# Patient Record
Sex: FEMALE | Race: WHITE | Employment: OTHER | URBAN - NONMETROPOLITAN AREA
[De-identification: names, ages, dates, MRNs, and addresses within clinical notes are randomized per-mention and may not be internally consistent; named-entity substitution may affect disease eponyms.]

---

## 2017-09-22 ENCOUNTER — APPOINTMENT (OUTPATIENT)
Dept: CT IMAGING | Age: 82
End: 2017-09-22
Payer: MEDICARE

## 2017-09-22 ENCOUNTER — HOSPITAL ENCOUNTER (EMERGENCY)
Age: 82
Discharge: HOME | End: 2017-09-22
Attending: FAMILY MEDICINE
Payer: MEDICARE

## 2017-09-22 VITALS
TEMPERATURE: 98.1 F | HEART RATE: 78 BPM | OXYGEN SATURATION: 98 % | DIASTOLIC BLOOD PRESSURE: 83 MMHG | RESPIRATION RATE: 17 BRPM | SYSTOLIC BLOOD PRESSURE: 168 MMHG
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
## 2017-09-22 DIAGNOSIS — T07.XXXA MULTIPLE CONTUSIONS: ICD-10-CM

## 2017-09-22 LAB
ANION GAP SERPL CALCULATED.3IONS-SCNC: 10 MEQ/L (ref 8–16)
ANISOCYTOSIS: ABNORMAL
BASOPHILS # BLD: 0.8 %
BASOPHILS ABSOLUTE: 0 THOU/MM3 (ref 0–0.1)
BUN BLDV-MCNC: 15 MG/DL (ref 7–22)
CALCIUM SERPL-MCNC: 9.6 MG/DL (ref 8.5–10.5)
CHLORIDE BLD-SCNC: 103 MEQ/L (ref 98–111)
CO2: 29 MEQ/L (ref 23–33)
CREAT SERPL-MCNC: 0.9 MG/DL (ref 0.4–1.2)
EOSINOPHIL # BLD: 1.9 %
EOSINOPHILS ABSOLUTE: 0.1 THOU/MM3 (ref 0–0.4)
GFR SERPL CREATININE-BSD FRML MDRD: 59 ML/MIN/1.73M2
GLUCOSE BLD-MCNC: 105 MG/DL (ref 70–108)
HCT VFR BLD CALC: 37.7 % (ref 37–47)
HEMOGLOBIN: 12.6 GM/DL (ref 12–16)
LYMPHOCYTES # BLD: 19.8 %
LYMPHOCYTES ABSOLUTE: 1.1 THOU/MM3 (ref 1–4.8)
MCH RBC QN AUTO: 31.6 PG (ref 27–31)
MCHC RBC AUTO-ENTMCNC: 33.4 GM/DL (ref 33–37)
MCV RBC AUTO: 94.5 FL (ref 81–99)
MONOCYTES # BLD: 7.1 %
MONOCYTES ABSOLUTE: 0.4 THOU/MM3 (ref 0.4–1.3)
NUCLEATED RED BLOOD CELLS: 0 /100 WBC
OSMOLALITY CALCULATION: 284.3 MOSMOL/KG (ref 275–300)
PDW BLD-RTO: 14.7 % (ref 11.5–14.5)
PLATELET # BLD: 263 THOU/MM3 (ref 130–400)
PMV BLD AUTO: 8.2 MCM (ref 7.4–10.4)
POTASSIUM SERPL-SCNC: 4.5 MEQ/L (ref 3.5–5.2)
RBC # BLD: 3.98 MILL/MM3 (ref 4.2–5.4)
RBC # BLD: NORMAL 10*6/UL
SEG NEUTROPHILS: 70.4 %
SEGMENTED NEUTROPHILS ABSOLUTE COUNT: 4.1 THOU/MM3 (ref 1.8–7.7)
SODIUM BLD-SCNC: 142 MEQ/L (ref 135–145)
WBC # BLD: 5.8 THOU/MM3 (ref 4.8–10.8)

## 2017-09-22 PROCEDURE — 12002 RPR S/N/AX/GEN/TRNK2.6-7.5CM: CPT

## 2017-09-22 PROCEDURE — 80048 BASIC METABOLIC PNL TOTAL CA: CPT

## 2017-09-22 PROCEDURE — 72125 CT NECK SPINE W/O DYE: CPT

## 2017-09-22 PROCEDURE — 85025 COMPLETE CBC W/AUTO DIFF WBC: CPT

## 2017-09-22 PROCEDURE — 70450 CT HEAD/BRAIN W/O DYE: CPT

## 2017-09-22 PROCEDURE — 90471 IMMUNIZATION ADMIN: CPT | Performed by: FAMILY MEDICINE

## 2017-09-22 PROCEDURE — 99284 EMERGENCY DEPT VISIT MOD MDM: CPT

## 2017-09-22 PROCEDURE — 36415 COLL VENOUS BLD VENIPUNCTURE: CPT

## 2017-09-22 PROCEDURE — 6360000002 HC RX W HCPCS: Performed by: FAMILY MEDICINE

## 2017-09-22 PROCEDURE — 90714 TD VACC NO PRESV 7 YRS+ IM: CPT | Performed by: FAMILY MEDICINE

## 2017-09-22 RX ORDER — LIDOCAINE HYDROCHLORIDE AND EPINEPHRINE 10; 10 MG/ML; UG/ML
INJECTION, SOLUTION INFILTRATION; PERINEURAL
Status: DISCONTINUED
Start: 2017-09-22 | End: 2017-09-22 | Stop reason: HOSPADM

## 2017-09-22 RX ADMIN — CLOSTRIDIUM TETANI TOXOID ANTIGEN (FORMALDEHYDE INACTIVATED) AND CORYNEBACTERIUM DIPHTHERIAE TOXOID ANTIGEN (FORMALDEHYDE INACTIVATED) 0.5 ML: 5; 2 INJECTION, SUSPENSION INTRAMUSCULAR at 09:50

## 2017-09-22 ASSESSMENT — PAIN DESCRIPTION - PAIN TYPE: TYPE: ACUTE PAIN

## 2017-09-22 ASSESSMENT — PAIN DESCRIPTION - ORIENTATION: ORIENTATION: LEFT

## 2017-09-22 ASSESSMENT — PAIN SCALES - GENERAL: PAINLEVEL_OUTOF10: 3

## 2017-09-22 ASSESSMENT — ENCOUNTER SYMPTOMS
ROS SKIN COMMENTS: SCALP LACERATION
VOMITING: 0
WHEEZING: 0
SHORTNESS OF BREATH: 0
FACIAL SWELLING: 0
NAUSEA: 0

## 2017-09-22 ASSESSMENT — PAIN DESCRIPTION - LOCATION: LOCATION: HEAD

## 2018-03-05 DIAGNOSIS — R45.1 AGITATION: Primary | ICD-10-CM

## 2018-03-05 RX ORDER — LORAZEPAM 0.5 MG/1
0.5 TABLET ORAL NIGHTLY PRN
Qty: 30 TABLET | Refills: 2 | Status: SHIPPED | OUTPATIENT
Start: 2018-03-05 | End: 2018-03-05

## 2018-03-05 RX ORDER — LORAZEPAM 0.5 MG/1
0.5 TABLET ORAL NIGHTLY PRN
Qty: 30 TABLET | Refills: 2 | Status: SHIPPED | OUTPATIENT
Start: 2018-03-05 | End: 2018-04-04

## 2019-03-13 ENCOUNTER — OUTSIDE SERVICES (OUTPATIENT)
Dept: FAMILY MEDICINE CLINIC | Age: 84
End: 2019-03-13
Payer: MEDICARE

## 2019-03-13 DIAGNOSIS — F41.1 GAD (GENERALIZED ANXIETY DISORDER): ICD-10-CM

## 2019-03-13 DIAGNOSIS — E03.8 OTHER SPECIFIED HYPOTHYROIDISM: ICD-10-CM

## 2019-03-13 DIAGNOSIS — I10 ESSENTIAL HYPERTENSION: Primary | ICD-10-CM

## 2019-03-13 DIAGNOSIS — G30.1 LATE ONSET ALZHEIMER'S DISEASE WITHOUT BEHAVIORAL DISTURBANCE (HCC): ICD-10-CM

## 2019-03-13 DIAGNOSIS — E78.5 DYSLIPIDEMIA: ICD-10-CM

## 2019-03-13 DIAGNOSIS — N39.41 URGE INCONTINENCE OF URINE: ICD-10-CM

## 2019-03-13 DIAGNOSIS — G89.4 CHRONIC PAIN SYNDROME: ICD-10-CM

## 2019-03-13 DIAGNOSIS — F02.80 LATE ONSET ALZHEIMER'S DISEASE WITHOUT BEHAVIORAL DISTURBANCE (HCC): ICD-10-CM

## 2019-03-13 DIAGNOSIS — F51.01 PRIMARY INSOMNIA: ICD-10-CM

## 2019-03-13 PROCEDURE — 1123F ACP DISCUSS/DSCN MKR DOCD: CPT | Performed by: FAMILY MEDICINE

## 2019-03-13 PROCEDURE — G8484 FLU IMMUNIZE NO ADMIN: HCPCS | Performed by: FAMILY MEDICINE

## 2019-03-13 PROCEDURE — 99308 SBSQ NF CARE LOW MDM 20: CPT | Performed by: FAMILY MEDICINE

## 2019-03-13 PROCEDURE — 1101F PT FALLS ASSESS-DOCD LE1/YR: CPT | Performed by: FAMILY MEDICINE

## 2019-04-16 PROBLEM — F41.1 GAD (GENERALIZED ANXIETY DISORDER): Chronic | Status: ACTIVE | Noted: 2019-03-13

## 2019-04-16 PROBLEM — N39.41 URGE INCONTINENCE OF URINE: Chronic | Status: ACTIVE | Noted: 2019-03-13

## 2019-04-16 PROBLEM — G89.4 CHRONIC PAIN SYNDROME: Chronic | Status: ACTIVE | Noted: 2019-03-13

## 2019-04-16 PROBLEM — F02.80 LATE ONSET ALZHEIMER'S DISEASE WITHOUT BEHAVIORAL DISTURBANCE (HCC): Chronic | Status: ACTIVE | Noted: 2019-03-13

## 2019-04-16 PROBLEM — G30.1 LATE ONSET ALZHEIMER'S DISEASE WITHOUT BEHAVIORAL DISTURBANCE (HCC): Chronic | Status: ACTIVE | Noted: 2019-03-13

## 2019-04-16 NOTE — PROGRESS NOTES
Gilbert Bennett is a 80 y.o. female that is being seen at [facility] for [address]  2/5/1928      HPI:  Patient seen and examined at bedside. History obtained from patient and chart. Patient states that she is doing well, denies any acute issues. I have reviewed the patient's past medical history, past surgical history, allergies,medications, social and family history and I have made updates where appropriate. Past Medical History:   Diagnosis Date    Anemia     CVA (cerebral infarction)     MARCO ANTONIO (generalized anxiety disorder) 3/13/2019    Hypertension     Hypothyroidism 5/15/2015    Osteoarthritis        Past Surgical History:   Procedure Laterality Date    APPENDECTOMY  2009    Dr. Pato Simmons  4/1/09    Low anterior resection-Dr Knight    COLON SURGERY  2011    abdominal exploration, EPIFANIO, SBR, repair of ventral hernia-Dr. Knight    COLONOSCOPY  99, 04, 09, 10    Dr Adilson Rodriguez  2006    [facility]   [address]  2011    Dr. Cody Lyles       Social History     Tobacco Use    Smoking status: Never Smoker    Smokeless tobacco: Never Used   Substance Use Topics    Alcohol use: No    Drug use: No       Family History   Problem Relation Age of Onset    Arthritis Mother     Arthritis Father     Heart Attack Father          Review of Systems        PHYSICAL EXAM:    Weight - 112 lbs  BP - 122/73  Temp - 97.6  HR -   RR - 18    Physical Exam   Constitutional: She appears well-developed and well-nourished. No distress. HENT:   Head: Normocephalic and atraumatic. Right Ear: Hearing and external ear normal.   Left Ear: Hearing and external ear normal.   Nose: Nose normal.   Mouth/Throat: Oropharynx is clear and moist.   Eyes: Conjunctivae are normal. Right eye exhibits no discharge. Left eye exhibits no discharge. Neck: Normal range of motion. Neck supple. No tracheal deviation present. No thyromegaly present.    Cardiovascular: Normal

## 2019-04-19 ENCOUNTER — OUTSIDE SERVICES (OUTPATIENT)
Dept: FAMILY MEDICINE CLINIC | Age: 84
End: 2019-04-19
Payer: MEDICARE

## 2019-04-19 DIAGNOSIS — I10 ESSENTIAL HYPERTENSION: Chronic | ICD-10-CM

## 2019-04-19 DIAGNOSIS — F02.80 LATE ONSET ALZHEIMER'S DISEASE WITHOUT BEHAVIORAL DISTURBANCE (HCC): Chronic | ICD-10-CM

## 2019-04-19 DIAGNOSIS — G89.4 CHRONIC PAIN SYNDROME: Chronic | ICD-10-CM

## 2019-04-19 DIAGNOSIS — N39.41 URGE INCONTINENCE OF URINE: Chronic | ICD-10-CM

## 2019-04-19 DIAGNOSIS — G30.1 LATE ONSET ALZHEIMER'S DISEASE WITHOUT BEHAVIORAL DISTURBANCE (HCC): Chronic | ICD-10-CM

## 2019-04-19 DIAGNOSIS — F41.1 GAD (GENERALIZED ANXIETY DISORDER): Chronic | ICD-10-CM

## 2019-04-19 DIAGNOSIS — E03.8 OTHER SPECIFIED HYPOTHYROIDISM: Primary | Chronic | ICD-10-CM

## 2019-04-19 PROCEDURE — 99308 SBSQ NF CARE LOW MDM 20: CPT | Performed by: FAMILY MEDICINE

## 2019-04-19 PROCEDURE — 1123F ACP DISCUSS/DSCN MKR DOCD: CPT | Performed by: FAMILY MEDICINE

## 2019-05-03 ENCOUNTER — OUTSIDE SERVICES (OUTPATIENT)
Dept: FAMILY MEDICINE CLINIC | Age: 84
End: 2019-05-03
Payer: MEDICARE

## 2019-05-03 DIAGNOSIS — R50.9 FEVER, UNSPECIFIED FEVER CAUSE: Primary | ICD-10-CM

## 2019-05-03 PROCEDURE — 99308 SBSQ NF CARE LOW MDM 20: CPT | Performed by: NURSE PRACTITIONER

## 2019-05-03 NOTE — PATIENT INSTRUCTIONS
[address] Progress Note    NAME: Elisa Carlisle: 19  ROOM #: A 11-2  : 1928  REASON FOR VISIT: Fever    CODE STATUS: DNR/CC    History obtained from chart review, the patient and staff. SUBJECTIVE:  HPI: Sujatha Bills is a 80 y.o. female. Patient has a PMH significant for:  Past Medical History:   Diagnosis Date    Anemia     CVA (cerebral infarction)     MARCO ANTONIO (generalized anxiety disorder) 3/13/2019    Hypertension     Hypothyroidism 5/15/2015    Osteoarthritis        Pt seen and examined at bedside today for staff's concerns of low grade temperature and fatigue. She is noted to be in no acute distress. Staff provide appropriate updates; appreciate staff input. I have reviewed patients past medical, surgical, social, and family history and have made updates where appropriate. Allergies and Medications were reviewed through the [facility] EMR.     Patient Active Problem List    Diagnosis Date Noted    Late onset Alzheimer's disease without behavioral disturbance 2019    Primary insomnia 2019    Chronic pain syndrome 2019    Dyslipidemia 2019    MARCO ANTONIO (generalized anxiety disorder) 2019    Urge incontinence of urine 2019    Cerebral infarction ([facility] [address]      Replacing Inactive Diagnoses      SBO (small bowel obstruction) ([facility] [address] 05/15/2015    Hypothyroidism 05/15/2015    HTN (hypertension) 2012    Osteoarthritis 2012       REVIEW OF SYSTEMS  Positive responses are highlighted in bold  Constitutional:  Fever, Chills, Night Sweats, Fatigue, Unexpected changes in weight  HENT:  Ear pain, Tinnitus, Nosebleeds, Trouble swallowing, Hearing loss, Sore throat  Cardiovascular:  Chest Pain, Palpitations, Orthopnea, Paroxysmal Nocturnal Dyspnea  Respiratory:  Cough, Wheezing, Shortness of breath, Chest tightness, Apnea  Gastrointestinal:  Nausea, Vomiting, Diarrhea, Constipation, Heartburn, Blood in stool  Genitourinary:  Difficulty or painful urination, Flank pain, Change in frequency, Urgency  Skin:  Color change, Rash, Itching, Wound  Musculoskeletal:  Joint pain, Back pain, Gait problems, Joint swelling, Myalgias  Neurological:  Dizziness, Headaches, Presyncope, Numbness, Seizures, Tremors  Endocrine:  Heat Intolerance, Cold Intolerance, Polydipsia, Polyphagia, Polyuria    OBJECTIVE:  PHYSICAL EXAM:  VS:  121/78, 98.7, 91, 19, 95%  Weight in pounds:  112.2  Pain: Denies    VS reviewed. General Appearance: well developed and well- nourished, in no acute distress  Head: normocephalic and atraumatic  Eyes: conjunctivae and eye lids without erythema  ENT: external ear and ear canal normal bilaterally, nose without deformity, nasal mucosa and turbinates normal without polyps, oropharynx normal, dentition is normal for age, no lip or gum lesions noted  Neck: supple and non-tender without mass, no thyromegaly or thyroid nodules, no cervical lymphadenopathy  Pulmonary/Chest: clear but diminished to auscultation bilaterally- no wheezes, rales or rhonchi, normal air movement, no respiratory distress or retractions, requires no supplemental oxygen  Cardiovascular: normal rate, regular rhythm, normal S1 and S2, no murmurs, rubs, clicks, or gallops, distal pulses intact  Abdomen: soft, non-tender, non-distended, bowel sounds physiologic,  no rebound or guarding, no masses or hernias noted. Liver and spleen without enlargement. Extremities: no cyanosis, clubbing or edema of the lower extremities  Musculoskeletal: No joint swelling or gross deformity, she is deconditioned  Neuro:  Alert, 4/5 strength globally and symmetrically, normal speech, no focal findings or movement disorder noted  Psych:  Normal affect without evidence of depression or anxiety, insight and judgement are intact, memory appears intat  Skin: warm and dry, no rash or erythema    ASSESSMENT & PLAN:    1.  Fever, unspecified fever cause  Staff report low grade temperature this morning of 99.1.  Patient is in bed and c/o feeling as if she has a cold. She is fatigued. Asking for Tylenol for comfort. Asking for stat CBC, BMP, CXR and to send urine for C&S. Asking for frequent VS with call parameters. She is well cared for by staff. Disposition:  Assessment and plan as stated above. Follow-up per routine and as warranted. Plan of care reviewed by Dr. Pat Chowdhury DO.   Electronically signed by SHAE Arias NP on 5/3/2019 at 6:56 PM

## 2019-05-29 ENCOUNTER — OUTSIDE SERVICES (OUTPATIENT)
Dept: FAMILY MEDICINE CLINIC | Age: 84
End: 2019-05-29
Payer: MEDICARE

## 2019-05-29 DIAGNOSIS — G30.1 LATE ONSET ALZHEIMER'S DISEASE WITHOUT BEHAVIORAL DISTURBANCE (HCC): Chronic | ICD-10-CM

## 2019-05-29 DIAGNOSIS — F51.01 PRIMARY INSOMNIA: ICD-10-CM

## 2019-05-29 DIAGNOSIS — E78.5 DYSLIPIDEMIA: ICD-10-CM

## 2019-05-29 DIAGNOSIS — F41.1 GAD (GENERALIZED ANXIETY DISORDER): Chronic | ICD-10-CM

## 2019-05-29 DIAGNOSIS — F02.80 LATE ONSET ALZHEIMER'S DISEASE WITHOUT BEHAVIORAL DISTURBANCE (HCC): Chronic | ICD-10-CM

## 2019-05-29 DIAGNOSIS — F33.0 MILD EPISODE OF RECURRENT MAJOR DEPRESSIVE DISORDER (HCC): ICD-10-CM

## 2019-05-29 DIAGNOSIS — E03.8 OTHER SPECIFIED HYPOTHYROIDISM: Chronic | ICD-10-CM

## 2019-05-29 DIAGNOSIS — I10 ESSENTIAL HYPERTENSION: Chronic | ICD-10-CM

## 2019-05-29 DIAGNOSIS — K56.609 SBO (SMALL BOWEL OBSTRUCTION) (HCC): Primary | ICD-10-CM

## 2019-05-29 PROBLEM — F33.9 RECURRENT MAJOR DEPRESSIVE DISORDER (HCC): Status: ACTIVE | Noted: 2019-05-29

## 2019-05-29 PROCEDURE — 99309 SBSQ NF CARE MODERATE MDM 30: CPT | Performed by: NURSE PRACTITIONER

## 2019-05-29 NOTE — PROGRESS NOTES
Heartburn, Blood in stool  Genitourinary:  Difficulty or painful urination, Flank pain, Change in frequency, Urgency  Skin:  Color change, Rash, Itching, Wound  Musculoskeletal:  Joint pain, Back pain, Gait problems, Joint swelling, Myalgias  Neurological:  Dizziness, Headaches, Presyncope, Numbness, Seizures, Tremors  Endocrine:  Heat Intolerance, Cold Intolerance, Polydipsia, Polyphagia, Polyuria    OBJECTIVE:  PHYSICAL EXAM:  VS:  128/73, 97.6, 67, 16, 93%  Weight in pounds:  101.2  Pain: Denies    VS reviewed. General Appearance: debilitated, in no acute distress  Head: normocephalic and atraumatic  Eyes: conjunctivae and eye lids without erythema  ENT: external ear and ear canal normal bilaterally, nose without deformity, nasal mucosa and turbinates normal without polyps, oropharynx normal, dentition is normal for age, no lip or gum lesions noted  Neck: supple and non-tender without mass, no thyromegaly or thyroid nodules, no cervical lymphadenopathy  Pulmonary/Chest: clear but diminished to auscultation bilaterally- no wheezes, rales or rhonchi, normal air movement, no respiratory distress or retractions, requires no supplemental oxygen  Cardiovascular: normal rate, regular rhythm, normal S1 and S2, no murmurs, rubs, clicks, or gallops, distal pulses intact  Abdomen: soft, non-tender, non-distended, bowel sounds physiologic,  no rebound or guarding, no masses or hernias noted. Liver and spleen without enlargement. Chronic, intermittent diarrhea and constipation. Extremities: no cyanosis, clubbing or edema of the lower extremities  Musculoskeletal: No joint swelling or gross deformity   Neuro:  Alert, 4/5 strength globally and symmetrically, normal speech, no focal findings or movement disorder noted  Psych:  Normal affect without evidence of depression or anxiety, insight and judgement are mostly intact, memory appears mostly intact  Skin: warm and dry, no rash or erythema    ASSESSMENT & PLAN:    1.  SBO (small bowel obstruction) (HCC)  History of bowel obstruction with bowel surgery. Daughter is involved in her care, and requests no anti-diarrheals due to her history of constipation and obstruction. Today, patient c/o no BM in 2 days. BS active throughout. She is non-distended. A staff member who speaks Korean assists to translate - appreciate staff assistance. Patient doesn't like to take Miralax because she then \"has diarrhea\". Patient agrees to take 1/2 dose of Miralax (8.5 grams) po this morning - encourage staff to offer her a 1/2 dose of Miralax in the mornings. Staff nurse updated. 2. Late onset Alzheimer's disease without behavioral disturbance  Patient is mostly pleasant and compliant. No behaviors. 3. Essential hypertension  BP at goal with Lopressor. She denies CP. 4. Primary insomnia  Controlled with Trazodone. 5. Other specified hypothyroidism  Within range with current dose of Levothyroxine. Continue TSH FT4 monitoring. 6. Dyslipidemia  Continue Pravastatin. No myalgias. 7. MARCO ANTONIO (generalized anxiety disorder)  Continue prn Ativan. Staff report anxiety is controlled. 8. Mild episode of recurrent major depressive disorder ([facility] [address]  Continue sessions with [facility] - patient is benefiting from Therapy. Daughter has health debilities but visits often. Patient lived at home with her daughter prior to admission to [facility]. Continue current dose of Prozac. Disposition:  Assessment and plan as stated above. Follow-up per routine and as warranted. Plan of care reviewed by Dr. Ana Diop DO.   Electronically signed by SHAE Newell NP on 5/29/2019 at 12:31 PM

## 2019-06-18 ENCOUNTER — OUTSIDE SERVICES (OUTPATIENT)
Dept: FAMILY MEDICINE CLINIC | Age: 84
End: 2019-06-18
Payer: MEDICARE

## 2019-06-18 DIAGNOSIS — F51.01 PRIMARY INSOMNIA: ICD-10-CM

## 2019-06-18 DIAGNOSIS — E03.8 OTHER SPECIFIED HYPOTHYROIDISM: Chronic | ICD-10-CM

## 2019-06-18 DIAGNOSIS — N39.41 URGE INCONTINENCE OF URINE: Chronic | ICD-10-CM

## 2019-06-18 DIAGNOSIS — M19.90 OSTEOARTHRITIS, UNSPECIFIED OSTEOARTHRITIS TYPE, UNSPECIFIED SITE: ICD-10-CM

## 2019-06-18 DIAGNOSIS — E78.5 DYSLIPIDEMIA: ICD-10-CM

## 2019-06-18 DIAGNOSIS — G30.1 LATE ONSET ALZHEIMER'S DISEASE WITHOUT BEHAVIORAL DISTURBANCE (HCC): Chronic | ICD-10-CM

## 2019-06-18 DIAGNOSIS — F33.0 MILD EPISODE OF RECURRENT MAJOR DEPRESSIVE DISORDER (HCC): ICD-10-CM

## 2019-06-18 DIAGNOSIS — F02.80 LATE ONSET ALZHEIMER'S DISEASE WITHOUT BEHAVIORAL DISTURBANCE (HCC): Chronic | ICD-10-CM

## 2019-06-18 DIAGNOSIS — K56.609 SBO (SMALL BOWEL OBSTRUCTION) (HCC): Primary | ICD-10-CM

## 2019-06-18 DIAGNOSIS — F41.1 GAD (GENERALIZED ANXIETY DISORDER): Chronic | ICD-10-CM

## 2019-06-18 DIAGNOSIS — I63.9 CEREBRAL INFARCTION, UNSPECIFIED MECHANISM (HCC): ICD-10-CM

## 2019-06-18 DIAGNOSIS — I10 ESSENTIAL HYPERTENSION: Chronic | ICD-10-CM

## 2019-06-18 DIAGNOSIS — G89.4 CHRONIC PAIN SYNDROME: Chronic | ICD-10-CM

## 2019-06-18 PROCEDURE — 99309 SBSQ NF CARE MODERATE MDM 30: CPT | Performed by: NURSE PRACTITIONER

## 2019-06-19 NOTE — PROGRESS NOTES
[facility] Progress Note    NAME: Desmond Fulton: 19  ROOM #: A 11-2  : 1928  REASON FOR VISIT: Other (Routine visit.)    CODE STATUS: DNR/CC    History obtained from chart review, the patient and staff. SUBJECTIVE:  HPI: Andra Dakins is a 80 y.o. female. Patient has a PMH significant for:  Past Medical History:   Diagnosis Date    Anemia     CVA (cerebral infarction)     MARCO ANTONIO (generalized anxiety disorder) 3/13/2019    Hypertension     Hypothyroidism 5/15/2015    Osteoarthritis     Recurrent major depressive disorder ([facility] [address] 2019       Pt seen and examined at bedside today and is noted to be in no acute distress. Staff provide appropriate updates; appreciate staff input. I have reviewed patients past medical, surgical, social, and family history and have made updates where appropriate. Allergies and Medications were reviewed through the [facility] EMR.     Patient Active Problem List    Diagnosis Date Noted    Recurrent major depressive disorder ([facility] [address] 2019    Late onset Alzheimer's disease without behavioral disturbance 2019    Primary insomnia 2019    Chronic pain syndrome 2019    Dyslipidemia 2019    MARCO ANTONIO (generalized anxiety disorder) 2019    Urge incontinence of urine 2019    Cerebral infarction ([address]      Replacing Inactive Diagnoses      SBO (small bowel obstruction) ([facility] [address] 05/15/2015    Hypothyroidism 05/15/2015    HTN (hypertension) 2012    Osteoarthritis 2012       REVIEW OF SYSTEMS  Positive responses are highlighted in bold  Constitutional:  Fever, Chills, Night Sweats, Fatigue, Unexpected changes in weight  HENT:  Ear pain, Tinnitus, Nosebleeds, Trouble swallowing, Hearing loss, Sore throat, Headaches  Cardiovascular:  Chest Pain, Palpitations, Orthopnea, Paroxysmal Nocturnal Dyspnea  Respiratory:  Cough, Wheezing, Shortness of breath, Chest tightness, Apnea  Gastrointestinal:  Nausea, Vomiting, Diarrhea, occur. Appreciate staff input and close monitoring. She is well cared for by staff. Disposition:  Assessment and plan as stated above. Follow-up per routine and as warranted. Plan of care reviewed by Dr. Chin Villegas DO.   Electronically signed by SHAE Mendez NP on 6/19/2019 at 8:38 AM

## 2019-07-17 NOTE — PROGRESS NOTES
Gricelda Goodman is a 80 y.o. female that is being seen at [facility] for [address]  2/5/1928      HPI:  Patient seen and examined at bedside. History obtained from patient and chart. Patient states that she is doing well, denies any acute issues. States that she is eating well. No constipation. Pain controlled. I have reviewed the patient's past medical history, past surgical history, allergies,medications, social and family history and I have made updates where appropriate. Past Medical History:   Diagnosis Date    Anemia     CVA (cerebral infarction)     MARCO ANTONIO (generalized anxiety disorder) 3/13/2019    Hypertension     Hypothyroidism 5/15/2015    Osteoarthritis     Recurrent major depressive disorder ([address] 5/29/2019       Past Surgical History:   Procedure Laterality Date    APPENDECTOMY  2009    Dr. Carole Crowder  4/1/09    Low anterior resection-Dr Knight    COLON SURGERY  2011    abdominal exploration, EPIFANIO, SBR, repair of ventral hernia-Dr. Knight    COLONOSCOPY  99, 04, 09, 10    Dr Nicky Saini  [facility]   Gaby Wolfe  2011    Dr. George Comfort History     Tobacco Use    Smoking status: Never Smoker    Smokeless tobacco: Never Used   Substance Use Topics    Alcohol use: No    Drug use: No       Family History   Problem Relation Age of Onset    Arthritis Mother     Arthritis Father     Heart Attack Father          Review of Systems        PHYSICAL EXAM:    Weight - 107 lbs  BP - 133/75  Temp - 98.0  HR - 62  RR - 17    Physical Exam   Constitutional: She appears well-developed and well-nourished. No distress. HENT:   Head: Normocephalic and atraumatic. Right Ear: Hearing and external ear normal.   Left Ear: Hearing and external ear normal.   Nose: Nose normal.   Mouth/Throat: Oropharynx is clear and moist.   Eyes: Conjunctivae are normal. Right eye exhibits no discharge.  Left eye exhibits no

## 2019-07-19 ENCOUNTER — OUTSIDE SERVICES (OUTPATIENT)
Dept: FAMILY MEDICINE CLINIC | Age: 84
End: 2019-07-19
Payer: MEDICARE

## 2019-07-19 DIAGNOSIS — F41.1 GAD (GENERALIZED ANXIETY DISORDER): Chronic | ICD-10-CM

## 2019-07-19 DIAGNOSIS — I10 ESSENTIAL HYPERTENSION: Primary | Chronic | ICD-10-CM

## 2019-07-19 DIAGNOSIS — E03.8 OTHER SPECIFIED HYPOTHYROIDISM: Chronic | ICD-10-CM

## 2019-07-19 DIAGNOSIS — N39.41 URGE INCONTINENCE OF URINE: Chronic | ICD-10-CM

## 2019-07-19 DIAGNOSIS — G30.1 LATE ONSET ALZHEIMER'S DISEASE WITHOUT BEHAVIORAL DISTURBANCE (HCC): Chronic | ICD-10-CM

## 2019-07-19 DIAGNOSIS — F02.80 LATE ONSET ALZHEIMER'S DISEASE WITHOUT BEHAVIORAL DISTURBANCE (HCC): Chronic | ICD-10-CM

## 2019-07-19 DIAGNOSIS — G89.4 CHRONIC PAIN SYNDROME: Chronic | ICD-10-CM

## 2019-07-19 PROCEDURE — 99308 SBSQ NF CARE LOW MDM 20: CPT | Performed by: FAMILY MEDICINE

## 2019-08-20 ENCOUNTER — OUTSIDE SERVICES (OUTPATIENT)
Dept: FAMILY MEDICINE CLINIC | Age: 84
End: 2019-08-20
Payer: MEDICARE

## 2019-08-20 DIAGNOSIS — F41.1 GAD (GENERALIZED ANXIETY DISORDER): Chronic | ICD-10-CM

## 2019-08-20 DIAGNOSIS — E78.5 DYSLIPIDEMIA: ICD-10-CM

## 2019-08-20 DIAGNOSIS — F33.0 MILD EPISODE OF RECURRENT MAJOR DEPRESSIVE DISORDER (HCC): Primary | ICD-10-CM

## 2019-08-20 DIAGNOSIS — M19.90 OSTEOARTHRITIS, UNSPECIFIED OSTEOARTHRITIS TYPE, UNSPECIFIED SITE: ICD-10-CM

## 2019-08-20 DIAGNOSIS — F02.80 LATE ONSET ALZHEIMER'S DISEASE WITHOUT BEHAVIORAL DISTURBANCE (HCC): Chronic | ICD-10-CM

## 2019-08-20 DIAGNOSIS — G30.1 LATE ONSET ALZHEIMER'S DISEASE WITHOUT BEHAVIORAL DISTURBANCE (HCC): Chronic | ICD-10-CM

## 2019-08-20 DIAGNOSIS — E03.8 OTHER SPECIFIED HYPOTHYROIDISM: Chronic | ICD-10-CM

## 2019-08-20 DIAGNOSIS — I10 ESSENTIAL HYPERTENSION: Chronic | ICD-10-CM

## 2019-08-20 DIAGNOSIS — G89.4 CHRONIC PAIN SYNDROME: Chronic | ICD-10-CM

## 2019-08-20 DIAGNOSIS — I63.9 CEREBRAL INFARCTION, UNSPECIFIED MECHANISM (HCC): ICD-10-CM

## 2019-08-20 PROCEDURE — 99308 SBSQ NF CARE LOW MDM 20: CPT | Performed by: NURSE PRACTITIONER

## 2019-09-25 ENCOUNTER — OUTSIDE SERVICES (OUTPATIENT)
Dept: FAMILY MEDICINE CLINIC | Age: 84
End: 2019-09-25
Payer: MEDICARE

## 2019-09-25 DIAGNOSIS — F02.80 LATE ONSET ALZHEIMER'S DISEASE WITHOUT BEHAVIORAL DISTURBANCE (HCC): Primary | Chronic | ICD-10-CM

## 2019-09-25 DIAGNOSIS — I10 ESSENTIAL HYPERTENSION: Chronic | ICD-10-CM

## 2019-09-25 DIAGNOSIS — F33.0 MILD EPISODE OF RECURRENT MAJOR DEPRESSIVE DISORDER (HCC): ICD-10-CM

## 2019-09-25 DIAGNOSIS — K56.609 SBO (SMALL BOWEL OBSTRUCTION) (HCC): ICD-10-CM

## 2019-09-25 DIAGNOSIS — G30.1 LATE ONSET ALZHEIMER'S DISEASE WITHOUT BEHAVIORAL DISTURBANCE (HCC): Primary | Chronic | ICD-10-CM

## 2019-09-25 DIAGNOSIS — E78.5 DYSLIPIDEMIA: ICD-10-CM

## 2019-09-25 DIAGNOSIS — G89.4 CHRONIC PAIN SYNDROME: Chronic | ICD-10-CM

## 2019-09-25 DIAGNOSIS — F51.01 PRIMARY INSOMNIA: ICD-10-CM

## 2019-09-25 DIAGNOSIS — F41.1 GAD (GENERALIZED ANXIETY DISORDER): Chronic | ICD-10-CM

## 2019-09-25 DIAGNOSIS — I63.9 CEREBRAL INFARCTION, UNSPECIFIED MECHANISM (HCC): ICD-10-CM

## 2019-09-25 DIAGNOSIS — E03.8 OTHER SPECIFIED HYPOTHYROIDISM: Chronic | ICD-10-CM

## 2019-09-25 PROCEDURE — 99308 SBSQ NF CARE LOW MDM 20: CPT | Performed by: NURSE PRACTITIONER

## 2019-10-17 ENCOUNTER — OUTSIDE SERVICES (OUTPATIENT)
Dept: FAMILY MEDICINE CLINIC | Age: 84
End: 2019-10-17
Payer: MEDICARE

## 2019-10-17 DIAGNOSIS — I63.9 CEREBRAL INFARCTION, UNSPECIFIED MECHANISM (HCC): ICD-10-CM

## 2019-10-17 DIAGNOSIS — E03.8 OTHER SPECIFIED HYPOTHYROIDISM: Chronic | ICD-10-CM

## 2019-10-17 DIAGNOSIS — F02.80 LATE ONSET ALZHEIMER'S DISEASE WITHOUT BEHAVIORAL DISTURBANCE (HCC): Chronic | ICD-10-CM

## 2019-10-17 DIAGNOSIS — F41.1 GAD (GENERALIZED ANXIETY DISORDER): Chronic | ICD-10-CM

## 2019-10-17 DIAGNOSIS — E78.5 DYSLIPIDEMIA: ICD-10-CM

## 2019-10-17 DIAGNOSIS — G89.4 CHRONIC PAIN SYNDROME: Chronic | ICD-10-CM

## 2019-10-17 DIAGNOSIS — I10 ESSENTIAL HYPERTENSION: Primary | Chronic | ICD-10-CM

## 2019-10-17 DIAGNOSIS — K56.609 SBO (SMALL BOWEL OBSTRUCTION) (HCC): ICD-10-CM

## 2019-10-17 DIAGNOSIS — G30.1 LATE ONSET ALZHEIMER'S DISEASE WITHOUT BEHAVIORAL DISTURBANCE (HCC): Chronic | ICD-10-CM

## 2019-10-17 DIAGNOSIS — F33.0 MILD EPISODE OF RECURRENT MAJOR DEPRESSIVE DISORDER (HCC): ICD-10-CM

## 2019-10-17 DIAGNOSIS — F51.01 PRIMARY INSOMNIA: ICD-10-CM

## 2019-10-17 PROCEDURE — 99309 SBSQ NF CARE MODERATE MDM 30: CPT | Performed by: NURSE PRACTITIONER

## 2019-11-13 VITALS
RESPIRATION RATE: 18 BRPM | TEMPERATURE: 97.4 F | BODY MASS INDEX: 19.39 KG/M2 | HEART RATE: 72 BPM | WEIGHT: 106 LBS | SYSTOLIC BLOOD PRESSURE: 131 MMHG | DIASTOLIC BLOOD PRESSURE: 76 MMHG

## 2019-11-15 ENCOUNTER — OUTSIDE SERVICES (OUTPATIENT)
Dept: FAMILY MEDICINE CLINIC | Age: 84
End: 2019-11-15
Payer: MEDICARE

## 2019-11-15 DIAGNOSIS — G30.1 LATE ONSET ALZHEIMER'S DISEASE WITHOUT BEHAVIORAL DISTURBANCE (HCC): Chronic | ICD-10-CM

## 2019-11-15 DIAGNOSIS — E03.8 OTHER SPECIFIED HYPOTHYROIDISM: Chronic | ICD-10-CM

## 2019-11-15 DIAGNOSIS — F41.1 GAD (GENERALIZED ANXIETY DISORDER): Chronic | ICD-10-CM

## 2019-11-15 DIAGNOSIS — I10 ESSENTIAL HYPERTENSION: Chronic | ICD-10-CM

## 2019-11-15 DIAGNOSIS — F02.80 LATE ONSET ALZHEIMER'S DISEASE WITHOUT BEHAVIORAL DISTURBANCE (HCC): Chronic | ICD-10-CM

## 2019-11-15 DIAGNOSIS — G89.4 CHRONIC PAIN SYNDROME: Chronic | ICD-10-CM

## 2019-11-15 DIAGNOSIS — N39.41 URGE INCONTINENCE OF URINE: Primary | Chronic | ICD-10-CM

## 2019-11-15 PROCEDURE — 99308 SBSQ NF CARE LOW MDM 20: CPT | Performed by: FAMILY MEDICINE

## 2019-12-10 ENCOUNTER — OUTSIDE SERVICES (OUTPATIENT)
Dept: FAMILY MEDICINE CLINIC | Age: 84
End: 2019-12-10
Payer: MEDICARE

## 2019-12-10 DIAGNOSIS — I63.9 CEREBRAL INFARCTION, UNSPECIFIED MECHANISM (HCC): ICD-10-CM

## 2019-12-10 DIAGNOSIS — I10 ESSENTIAL HYPERTENSION: Chronic | ICD-10-CM

## 2019-12-10 DIAGNOSIS — M19.90 OSTEOARTHRITIS, UNSPECIFIED OSTEOARTHRITIS TYPE, UNSPECIFIED SITE: Primary | ICD-10-CM

## 2019-12-10 DIAGNOSIS — F41.1 GAD (GENERALIZED ANXIETY DISORDER): Chronic | ICD-10-CM

## 2019-12-10 DIAGNOSIS — E03.8 OTHER SPECIFIED HYPOTHYROIDISM: Chronic | ICD-10-CM

## 2019-12-10 DIAGNOSIS — K56.609 SBO (SMALL BOWEL OBSTRUCTION) (HCC): ICD-10-CM

## 2019-12-10 DIAGNOSIS — F33.0 MILD EPISODE OF RECURRENT MAJOR DEPRESSIVE DISORDER (HCC): ICD-10-CM

## 2019-12-10 DIAGNOSIS — E78.5 DYSLIPIDEMIA: ICD-10-CM

## 2019-12-10 DIAGNOSIS — F02.80 LATE ONSET ALZHEIMER'S DISEASE WITHOUT BEHAVIORAL DISTURBANCE (HCC): Chronic | ICD-10-CM

## 2019-12-10 DIAGNOSIS — G30.1 LATE ONSET ALZHEIMER'S DISEASE WITHOUT BEHAVIORAL DISTURBANCE (HCC): Chronic | ICD-10-CM

## 2019-12-10 PROCEDURE — 99308 SBSQ NF CARE LOW MDM 20: CPT | Performed by: NURSE PRACTITIONER

## 2020-01-15 ENCOUNTER — OUTSIDE SERVICES (OUTPATIENT)
Dept: FAMILY MEDICINE CLINIC | Age: 85
End: 2020-01-15
Payer: MEDICARE

## 2020-01-15 PROCEDURE — 99309 SBSQ NF CARE MODERATE MDM 30: CPT | Performed by: NURSE PRACTITIONER

## 2020-01-16 NOTE — PROGRESS NOTES
[address] [facility]  Progress Note    NAME: Bonnie Lynn: [birth year]  ROOM #: A 11-2  : 1928  REASON FOR VISIT: Other (routine visit)    CODE STATUS: DNR/CC    History obtained from chart review, the patient and staff. SUBJECTIVE:  HPI: Luz Salazar is a 80 y.o. female. Patient has a PMH significant for:  Past Medical History:   Diagnosis Date    Anemia     CVA (cerebral infarction)     MARCO ANTONIO (generalized anxiety disorder) 3/13/2019    Hypertension     Hypothyroidism 5/15/2015    Osteoarthritis     Recurrent major depressive disorder ([facility] [address] 2019       Pt seen and examined at bedside today and is noted to be in no acute distress. Staff provide appropriate updates; appreciate staff input. I have reviewed patients past medical, surgical, social, and family history and have made updates where appropriate. Allergies and Medications were reviewed through the [facility] EMR.     Patient Active Problem List    Diagnosis Date Noted    Recurrent major depressive disorder ([facility] [address] 2019    Late onset Alzheimer's disease without behavioral disturbance ([address] 2019    Primary insomnia 2019    Chronic pain syndrome 2019    Dyslipidemia 2019    MARCO ANTONIO (generalized anxiety disorder) 2019    Urge incontinence of urine 2019    Cerebral infarction ([address]      Replacing Inactive Diagnoses      SBO (small bowel obstruction) ([facility] [address] 05/15/2015    Hypothyroidism 05/15/2015    HTN (hypertension) 2012    Osteoarthritis 2012       REVIEW OF SYSTEMS  Positive responses are highlighted in bold  Constitutional:  Fever, Chills, Night Sweats, Fatigue, Unexpected changes in weight  HENT:  Ear pain, Tinnitus, Nosebleeds, Trouble swallowing, Hearing loss, Sore throat, Headaches  Cardiovascular:  Chest Pain, Palpitations, Orthopnea, Paroxysmal Nocturnal Dyspnea  Respiratory:  Cough, Wheezing, Shortness of breath, Chest tightness, Apnea  Gastrointestinal:  Nausea, Vomiting, Diarrhea, Constipation, Heartburn, Blood in stool, \"bowels are moving good\"  Genitourinary:  Difficulty or painful urination, Flank pain, Change in frequency, Urgency  Skin:  Color change, Rash, Itching, Wound  Musculoskeletal:  Joint pain, Back pain, Gait problems, Joint swelling, Myalgias  Neurological:  Dizziness, Headaches, Presyncope, Numbness, Seizures, Tremors; acute posterior neck pain  Endocrine:  Heat Intolerance, Cold Intolerance, Polydipsia, Polyphagia, Polyuria    OBJECTIVE:  PHYSICAL EXAM:  VS:  122/74, 97.8, 67, 16  Weight in pounds:  104.8 (was 104.0, 106.2, 107.4, 108, 106.4)  Pain:  Denies. VS reviewed. General Appearance: well developed and well- nourished, debilitated, in no acute distress  Head: normocephalic and atraumatic  Eyes: conjunctivae and eye lids without erythema  ENT: external ear and ear canal normal bilaterally, nose without deformity, nasal mucosa and turbinates normal without polyps, oropharynx normal, dentition is normal for age, no lip or gum lesions noted  Neck: supple and non-tender without mass, no thyromegaly or thyroid nodules, no cervical lymphadenopathy  Pulmonary/Chest: clear but diminished to auscultation bilaterally- no wheezes, rales or rhonchi, normal air movement, no respiratory distress or retractions, requires no supplemental oxygen  Cardiovascular: normal rate, regular rhythm, normal S1 and S2, no murmurs, rubs, clicks, or gallops, distal pulses intact  Abdomen: soft, non-tender, non-distended, bowel sounds physiologic,  no rebound or guarding, no masses or hernias noted. Liver and spleen without enlargement.    Extremities: no cyanosis, clubbing or edema of the lower extremities  Musculoskeletal: No joint swelling or gross deformity   Neuro:  Alert, 4/5 strength globally and symmetrically, normal speech, no focal findings or movement disorder noted  Psych:  Normal affect without evidence of depression or anxiety, insight and judgement are mostly intact, Dyslipidemia  Continue Pravastatin. No ASA. 11. Osteoarthritis, unspecified osteoarthritis type, unspecified site  Continue calcium plus D. 12. Cerebral infarction, unspecified mechanism ([facility] [address]  Remote without deficits. 13.  Acute headaches. No new or reoccurring headaches. Continue prn Tylenol as needed. Disposition:  Assessment and plan as stated above. Follow-up per routine and as warranted. Plan of care reviewed by Dr. Daniel Gandhi DO.   Electronically signed by SHAE Rosario NP on 1/16/2020 at 9:57 AM

## 2020-02-10 ENCOUNTER — OUTSIDE SERVICES (OUTPATIENT)
Dept: FAMILY MEDICINE CLINIC | Age: 85
End: 2020-02-10
Payer: MEDICARE

## 2020-02-10 PROCEDURE — 99309 SBSQ NF CARE MODERATE MDM 30: CPT | Performed by: NURSE PRACTITIONER

## 2020-02-10 NOTE — PROGRESS NOTES
[facility] [facility]  Progress Note    NAME: Grayson Perdomot: 2/10/20  ROOM #: A 11-2  : 1928  REASON FOR VISIT: Other (routine visit)    CODE STATUS: DNR/CC    History obtained from chart review, the patient and staff. SUBJECTIVE:  HPI: Leninюлия Olivo is a 80 y.o. female. Patient has a PMH significant for:  Past Medical History:   Diagnosis Date    Anemia     CVA (cerebral infarction)     MARCO ANTONIO (generalized anxiety disorder) 3/13/2019    Hypertension     Hypothyroidism 5/15/2015    Osteoarthritis     Recurrent major depressive disorder ([facility] 2019       Pt seen and examined at bedside today and is noted to be in no acute distress. Staff provide appropriate updates; appreciate staff input. I have reviewed patients past medical, surgical, social, and family history and have made updates where appropriate. Allergies and Medications were reviewed through the [facility] EMR.     Patient Active Problem List    Diagnosis Date Noted    Recurrent major depressive disorder ([facility] [address] 2019    Late onset Alzheimer's disease without behavioral disturbance ([facility] [address] 2019    Primary insomnia 2019    Chronic pain syndrome 2019    Dyslipidemia 2019    MARCO ANTONIO (generalized anxiety disorder) 2019    Urge incontinence of urine 2019    Cerebral infarction ([facility] [address]      Replacing Inactive Diagnoses      SBO (small bowel obstruction) ([facility] [address] 05/15/2015    Hypothyroidism 05/15/2015    HTN (hypertension) 2012    Osteoarthritis 2012       REVIEW OF SYSTEMS  Positive responses are highlighted in bold  Constitutional:  Fever, Chills, Night Sweats, Fatigue, Unexpected changes in weight  HENT:  Ear pain, Tinnitus, Nosebleeds, Trouble swallowing, Hearing loss, Sore throat, Headaches  Cardiovascular:  Chest Pain, Palpitations, Orthopnea, Paroxysmal Nocturnal Dyspnea  Respiratory:  Cough, Wheezing, Shortness of breath, Chest tightness, Apnea  Gastrointestinal:  Nausea, Vomiting, Diarrhea, Constipation, Heartburn, Blood in stool, \"bowels are moving good\"  Genitourinary:  Difficulty or painful urination, Flank pain, Change in frequency, Urgency  Skin:  Color change, Rash, Itching, Wound  Musculoskeletal:  Joint pain, Back pain, Gait problems, Joint swelling, Myalgias  Neurological:  Dizziness, Headaches, Presyncope, Numbness, Seizures, Tremors; acute posterior neck pain  Endocrine:  Heat Intolerance, Cold Intolerance, Polydipsia, Polyphagia, Polyuria    OBJECTIVE:  PHYSICAL EXAM:  VS:  138/72, 97.7, 77, 18, 95% on room air  Weight in pounds:  105.2 (was 104.8, 104.0, 106.2, 107.4, 108, 106.4)  Pain:  Denies. VS reviewed. General Appearance: well developed and well- nourished, debilitated, in no acute distress  Head: normocephalic and atraumatic  Eyes: conjunctivae and eye lids without erythema  ENT: external ear and ear canal normal bilaterally, nose without deformity, nasal mucosa and turbinates normal without polyps, oropharynx normal, dentition is normal for age, no lip or gum lesions noted  Neck: supple and non-tender without mass, no thyromegaly or thyroid nodules, no cervical lymphadenopathy  Pulmonary/Chest: clear but diminished to auscultation bilaterally- no wheezes, rales or rhonchi, normal air movement, no respiratory distress or retractions, requires no supplemental oxygen  Cardiovascular: normal rate, regular rhythm, normal S1 and S2, no murmurs, rubs, clicks, or gallops, distal pulses intact  Abdomen: soft, non-tender, non-distended, bowel sounds physiologic,  no rebound or guarding, no masses or hernias noted. Liver and spleen without enlargement.    Extremities: no cyanosis, clubbing or edema of the lower extremities  Musculoskeletal: No joint swelling or gross deformity   Neuro:  Alert, 4/5 strength globally and symmetrically, normal speech, no focal findings or movement disorder noted  Psych:  Normal affect without evidence of depression or anxiety, insight and judgement are mostly intact, memory appears mostly intact  Skin: warm and dry, no rash or erythema    ASSESSMENT & PLAN:    1. SBO (small bowel obstruction) ([facility]  Patient with a history of SBO, diarrhea and constipation. Daughter cautions use with anti-diarrheal medications. Patient and staff report bowels are moving well, mostly once daily, with no episodes of diarrhea or constipation. Continue current bowel medication regimen. 2. Chronic pain syndrome  Continue home dose of Ketorolac, Tylenol, Biofreeze gel as ordered. Patient reports pain is adequately controlled. This CNP applied BioFreeze gel to patient's posterior neck for pain that she has been experiencing the past 3 days. Patient reports comfort. 3. MARCO ANTONIO (generalized anxiety disorder)  Communicates well with staff. Staff report no increased anxiety. Continue Ativan 0.5 mg po daily at HS. 4. Essential hypertension  BP at goal.  No chest pain/pressure. Continue Metoprolol. 5. Mild episode of recurrent major depressive disorder ([facility]  Denies SI/HI. Continue Trazodone at HS, Prozac. She is pleasant at time of assessment. Antipsychotic/Antianiety/Hypnotic/Psychyotropic/Sedation/Antidepressant medications are continued at this time because discontinuation may result in adverse effects of return or concerning behaviors/symptoms. 6. Other specified hypothyroidism  Continue current dose of Levothyroxine and lab monitoring. Asymptomatic of hypo/hyperthyroidism. 7. Late onset Alzheimer's disease without behavioral disturbance  Alert and oriented most days. Mild confusion today. Continues home dose of Turmeric. 8. Urge incontinence of urine  Controlled with Oxybutynin. 9. Primary insomnia  Controlled with Ativan and Trazodone. 10. Dyslipidemia  Continue Pravastatin. No ASA. 11. Osteoarthritis, unspecified osteoarthritis type, unspecified site  Continue calcium plus D.     12. Cerebral infarction, unspecified mechanism ([facility]  Remote without deficits. 13.  Acute headaches. No new or reoccurring headaches. Continue prn Tylenol as needed. Disposition:  Assessment and plan as stated above. Follow-up per routine and as warranted. Plan of care reviewed by Dr. Murtaza Leigh DO.   Electronically signed by SHAE Lindsey NP on 2/10/2020 at 11:57 AM

## 2020-03-04 VITALS
BODY MASS INDEX: 19.2 KG/M2 | SYSTOLIC BLOOD PRESSURE: 138 MMHG | RESPIRATION RATE: 18 BRPM | TEMPERATURE: 97.7 F | DIASTOLIC BLOOD PRESSURE: 72 MMHG | WEIGHT: 105 LBS | HEART RATE: 77 BPM

## 2020-03-05 NOTE — PROGRESS NOTES
Melanie Knutson is a 80 y.o. female that is being seen at [facility] for Hypertension      Melanie Knutson  2/5/1928      HPI:  Patient was not seen on 3/6/20 due to not being available on rounds. I have reviewed the patient's past medical history, past surgical history, allergies,medications, social and family history and I have made updates where appropriate. Past Medical History:   Diagnosis Date    Anemia     CVA (cerebral infarction)     MARCO ANTONIO (generalized anxiety disorder) 3/13/2019    Hypertension     Hypothyroidism 5/15/2015    Osteoarthritis     Recurrent major depressive disorder ([facility] 5/29/2019       Past Surgical History:   Procedure Laterality Date    APPENDECTOMY  2009    Dr. Delfin Calr  4/1/09    Low anterior resection-Dr Knight    COLON SURGERY  2011    abdominal exploration, EPIFANIO, SBR, repair of ventral hernia-Dr. Karis David    COLONOSCOPY  99, 04, 09, 10    Dr Dulce Fan  2006    [facility]   Steven Bunting  2011    Dr. Sage Lower History     Tobacco Use    Smoking status: Never Smoker    Smokeless tobacco: Never Used   Substance Use Topics    Alcohol use: No    Drug use: No       Family History   Problem Relation Age of Onset    Arthritis Mother     Arthritis Father     Heart Attack Father          Review of Systems        PHYSICAL EXAM:    There were no vitals taken for this visit. Physical Exam  Vitals signs and nursing note reviewed. Constitutional:       General: She is not in acute distress. Appearance: She is well-developed. HENT:      Head: Normocephalic and atraumatic. Right Ear: Hearing and external ear normal.      Left Ear: Hearing and external ear normal.      Nose: Nose normal.   Eyes:      General:         Right eye: No discharge. Left eye: No discharge. Conjunctiva/sclera: Conjunctivae normal.   Neck:      Musculoskeletal: Normal range of motion and neck supple.       Thyroid: No

## 2020-03-06 ENCOUNTER — OUTSIDE SERVICES (OUTPATIENT)
Dept: FAMILY MEDICINE CLINIC | Age: 85
End: 2020-03-06

## 2020-03-12 VITALS
SYSTOLIC BLOOD PRESSURE: 138 MMHG | HEART RATE: 77 BPM | BODY MASS INDEX: 19.2 KG/M2 | DIASTOLIC BLOOD PRESSURE: 72 MMHG | RESPIRATION RATE: 18 BRPM | WEIGHT: 105 LBS | TEMPERATURE: 97.7 F

## 2020-03-13 ENCOUNTER — OUTSIDE SERVICES (OUTPATIENT)
Dept: FAMILY MEDICINE CLINIC | Age: 85
End: 2020-03-13
Payer: MEDICARE

## 2020-03-13 PROCEDURE — 99308 SBSQ NF CARE LOW MDM 20: CPT | Performed by: FAMILY MEDICINE

## 2020-04-03 ENCOUNTER — OUTSIDE SERVICES (OUTPATIENT)
Dept: FAMILY MEDICINE CLINIC | Age: 85
End: 2020-04-03
Payer: MEDICARE

## 2020-04-03 PROCEDURE — 99308 SBSQ NF CARE LOW MDM 20: CPT | Performed by: NURSE PRACTITIONER

## 2020-04-30 ENCOUNTER — OUTSIDE SERVICES (OUTPATIENT)
Dept: FAMILY MEDICINE CLINIC | Age: 85
End: 2020-04-30

## 2020-04-30 NOTE — PROGRESS NOTES
lower extremities  Musculoskeletal: No joint swelling or gross deformity; she denies pain  Neuro:  Alert, 4/5 strength globally and symmetrically, normal speech, no focal findings or movement disorder noted  Psych:  Normal affect without evidence of depression or anxiety, insight and judgement are mostly intact, memory appears mostly intact  Skin: warm and dry, no rash or erythema    ASSESSMENT & PLAN:    1. Acute Right Lower Lobe pneumonia  4/29 2-view CXR:  Results: Lungs: Right airspace opacities. Pulmonary vasculature is within normal  limits. Diffuse increase in lung markings. Pleura: No pneumothorax. No pleural  effusion. Heart and Mediastinum: The cardiomediastinal silhouette is prominent in  size and contour. Osseous structures: Visualized osseous structures are stable. No  radiopaque foreign body. Conclusion: Right basilar airspace disease. Clinical correlation. Recommend follow  up examination to confirm resolution of findings. Initiating Augmentin 500 mg po every 8 hours x 5 days. Probiotic 1 cap po tid while on Augmentin. DuoNebs, UD, every 4 hours while awake x 72 hours and q4h prn. OK for supplemental oxygen for comfort. VS every 8 hours x 72 hours - asking staff to call abnormals. Asking ST to eval and treat on Monday for possible aspiration. Disposition:  Assessment and plan as stated above. Follow-up per routine and as warranted. Plan of care reviewed by Dr. Eun Aldridge DO.   Electronically signed by SHAE Shine NP on 4/30/2020 at 9:15 AM

## 2020-05-01 ENCOUNTER — OUTSIDE SERVICES (OUTPATIENT)
Dept: FAMILY MEDICINE CLINIC | Age: 85
End: 2020-05-01
Payer: MEDICARE

## 2020-05-01 PROCEDURE — 99308 SBSQ NF CARE LOW MDM 20: CPT | Performed by: NURSE PRACTITIONER

## 2020-05-01 NOTE — PROGRESS NOTES
opacities. Pulmonary vasculature is within normal  limits. Diffuse increase in lung markings. Pleura: No pneumothorax. No pleural  effusion. Heart and Mediastinum: The cardiomediastinal silhouette is prominent in  size and contour. Osseous structures: Visualized osseous structures are stable. No  radiopaque foreign body. Conclusion: Right basilar airspace disease. Clinical correlation. Recommend follow  up examination to confirm resolution of findings. Continues to do well on Augmentin 500 mg po every 8 hours x 5 days. Continue Probiotic 1 cap po tid while on Augmentin. DuoNebs, UD, every 4 hours while awake x 72 hours and q4h prn. OK for supplemental oxygen for comfort. VS every 8 hours x 72 hours - asking staff to call abnormals. Asking ST to eval and treat on Monday for possible aspiration. She is up in a chair this morning and states \"I will know after a while\" when asked how she feels. Staff are assisting her with meals. Disposition:  Assessment and plan as stated above. Follow-up per routine and as warranted. Plan of care reviewed by Dr. Tracie Tomlinson DO.   Electronically signed by SHAE Frazier NP on 5/1/2020 at 2:27 PM

## 2020-06-02 ENCOUNTER — OUTSIDE SERVICES (OUTPATIENT)
Dept: FAMILY MEDICINE CLINIC | Age: 85
End: 2020-06-02
Payer: MEDICARE

## 2020-06-02 PROCEDURE — 99309 SBSQ NF CARE MODERATE MDM 30: CPT | Performed by: NURSE PRACTITIONER

## 2020-06-03 ENCOUNTER — OUTSIDE SERVICES (OUTPATIENT)
Dept: FAMILY MEDICINE CLINIC | Age: 85
End: 2020-06-03
Payer: MEDICARE

## 2020-06-03 PROCEDURE — 99309 SBSQ NF CARE MODERATE MDM 30: CPT | Performed by: NURSE PRACTITIONER

## 2020-06-03 NOTE — PROGRESS NOTES
Nocturnal Dyspnea  Respiratory:  Cough, Wheezing, Shortness of breath, Chest tightness, Apnea  Gastrointestinal:  Nausea, Vomiting, Diarrhea, Constipation, Heartburn, Blood in stool, \"bowels are moving good\"  Genitourinary:  Difficulty or painful urination, Flank pain, Change in frequency, Urgency  Skin:  Color change, Rash, Itching, Wound  Musculoskeletal:  Joint pain, Back pain, Gait problems, Joint swelling, Myalgias  Neurological:  Dizziness, Headaches, Presyncope, Numbness, Seizures, Tremors; acute posterior neck pain  Endocrine:  Heat Intolerance, Cold Intolerance, Polydipsia, Polyphagia, Polyuria    OBJECTIVE:  PHYSICAL EXAM:  VS:  122/68, 97.7, 52, 99% on supplemental oxygen. Weight in pounds:  103.6 (was 105.8, 105.2, 104.8, 104.0)  Pain:  Denies. VS reviewed. General Appearance: well developed and well- nourished, debilitated, in no acute distress  Head: normocephalic and atraumatic  Eyes: conjunctivae and eye lids without erythema  ENT: external ear and ear canal normal bilaterally, nose without deformity, nasal mucosa and turbinates normal without polyps, oropharynx normal, dentition is normal for age, no lip or gum lesions noted  Neck: supple and non-tender without mass, no thyromegaly or thyroid nodules, no cervical lymphadenopathy  Pulmonary/Chest: clear but diminished to auscultation bilaterally- no wheezes, rales or rhonchi, normal air movement, no respiratory distress or retractions, requires no supplemental oxygen  Cardiovascular: normal rate, regular rhythm, normal S1 and S2, no murmurs, rubs, clicks, or gallops, distal pulses intact  Abdomen: soft, non-tender, non-distended, bowel sounds physiologic,  no rebound or guarding, no masses or hernias noted. Liver and spleen without enlargement.    Extremities: no cyanosis, clubbing or edema of the lower extremities  Musculoskeletal: No joint swelling or gross deformity   Neuro:  Alert but lethargic, no speech, no focal findings or movement disorder noted  Psych:  Flat affect without evidence of depression or anxiety, insight and judgement are mostly intact, memory appears mostly intact  Skin: warm and dry, no rash or erythema    ASSESSMENT & PLAN:    1. Fever of unknown origin  Per discussion with patient's family, will keep patient at [facility]; do not send to ER. History of recent pneumonias. Family is asking for CBC, BMP, urine for C&S, stat 2-view CXR. Continue comfort measures. Appreciate staff updates and care of patient. Continue to monitor. Disposition:  Assessment and plan as stated above. Follow-up per routine and as warranted. Plan of care reviewed by Dr. Harsha Simons DO.   Electronically signed by SHAE Phelps NP on 6/3/2020 at 6:26 PM

## 2020-06-04 NOTE — PROGRESS NOTES
[facility] [facility] [[email or web address]]  Progress Note    NAME: Jenna Lozano: 20  ROOM #: A 12-2  : 1928  REASON FOR VISIT: Other (routine visit)    CODE STATUS: DNR/CC    History obtained from chart review, the patient and staff. SUBJECTIVE:  HPI: Radha Lindsey is a 80 y.o. female. Patient has a PMH significant for:  Past Medical History:   Diagnosis Date    Anemia     CVA (cerebral infarction)     MARCO ANTONIO (generalized anxiety disorder) 3/13/2019    Hypertension     Hypothyroidism 5/15/2015    Osteoarthritis     Recurrent major depressive disorder ([facility] [address] 2019       Pt seen and examined at bedside today and is noted to be in no acute distress. Staff provide appropriate updates; appreciate staff input. I have reviewed patients past medical, surgical, social, and family history and have made updates where appropriate. Allergies and Medications were reviewed through the [facility] EMR.     Patient Active Problem List    Diagnosis Date Noted    Recurrent major depressive disorder ([facility] [address] 2019    Late onset Alzheimer's disease without behavioral disturbance ([facility] [address] 2019    Primary insomnia 2019    Chronic pain syndrome 2019    Dyslipidemia 2019    MARCO ANTONIO (generalized anxiety disorder) 2019    Urge incontinence of urine 2019    Cerebral infarction ([facility] [address]      Replacing Inactive Diagnoses      SBO (small bowel obstruction) ([facility] [address] 05/15/2015    Hypothyroidism 05/15/2015    HTN (hypertension) 2012    Osteoarthritis 2012     REVIEW OF SYSTEMS  Positive responses are highlighted in bold  Constitutional:  Fever, Chills, Night Sweats, Fatigue, Unexpected changes in weight  HENT:  Ear pain, Tinnitus, Nosebleeds, Trouble swallowing, Hearing loss, Sore throat, Headaches  Cardiovascular:  Chest Pain, Palpitations, Orthopnea, Paroxysmal Nocturnal Dyspnea  Respiratory:  Cough, Wheezing, Shortness of breath, Chest tightness, Apnea  Gastrointestinal:  Nausea, Vomiting, Diarrhea, Constipation, Heartburn, Blood in stool, \"bowels are moving good\"  Genitourinary:  Difficulty or painful urination, Flank pain, Change in frequency, Urgency  Skin:  Color change, Rash, Itching, Wound  Musculoskeletal:  Joint pain, Back pain, Gait problems, Joint swelling, Myalgias  Neurological:  Dizziness, Headaches, Presyncope, Numbness, Seizures, Tremors  Endocrine:  Heat Intolerance, Cold Intolerance, Polydipsia, Polyphagia, Polyuria    OBJECTIVE:  PHYSICAL EXAM:  VS:  98/59, 98.1, 81, 20, 91^  Weight in pounds:  103.8 (was 105.8, 105.2, 104.8, 104.0)  Pain:  Denies. VS reviewed. General Appearance: well developed and well- nourished, debilitated, in no acute distress  Head: normocephalic and atraumatic  Eyes: conjunctivae and eye lids without erythema  ENT: external ear and ear canal normal bilaterally, nose without deformity, nasal mucosa and turbinates normal without polyps, oropharynx normal, dentition is normal for age, no lip or gum lesions noted  Neck: supple and non-tender without mass, no thyromegaly or thyroid nodules, no cervical lymphadenopathy  Pulmonary/Chest: clear but diminished to auscultation bilaterally- no wheezes, rales or rhonchi, normal air movement, no respiratory distress or retractions, requires no supplemental oxygen  Cardiovascular: normal rate, regular rhythm, normal S1 and S2, no murmurs, rubs, clicks, or gallops, distal pulses intact  Abdomen: soft, non-tender, non-distended, bowel sounds physiologic,  no rebound or guarding, no masses or hernias noted. Liver and spleen without enlargement.    Extremities: no cyanosis, clubbing or edema of the lower extremities  Musculoskeletal: No joint swelling or gross deformity   Neuro:  Alert, 4/5 strength globally and symmetrically, normal speech, no focal findings or movement disorder noted  Psych:  Normal affect without evidence of depression or anxiety, insight and judgement are mostly intact, memory appears mostly

## 2020-07-08 VITALS
RESPIRATION RATE: 18 BRPM | HEART RATE: 64 BPM | TEMPERATURE: 97.9 F | DIASTOLIC BLOOD PRESSURE: 74 MMHG | SYSTOLIC BLOOD PRESSURE: 109 MMHG | WEIGHT: 103 LBS | BODY MASS INDEX: 18.84 KG/M2

## 2020-07-09 NOTE — PROGRESS NOTES
range of motion and neck supple. Thyroid: No thyromegaly. Trachea: No tracheal deviation. Cardiovascular:      Rate and Rhythm: Normal rate and regular rhythm. Heart sounds: Normal heart sounds. No murmur. No friction rub. No gallop. Pulmonary:      Effort: Pulmonary effort is normal. No respiratory distress. Breath sounds: No wheezing or rales. Chest:      Chest wall: No tenderness. Musculoskeletal:         General: No deformity. Lymphadenopathy:      Cervical: No cervical adenopathy. Skin:     General: Skin is warm and dry. Findings: No erythema or rash. Neurological:      Mental Status: She is alert. Motor: No abnormal muscle tone. Coordination: Coordination normal.   Psychiatric:         Cognition and Memory: Memory is impaired. Judgment: Judgment is inappropriate. Karyn Carrizales was seen today for dementia. Diagnoses and all orders for this visit:    Essential hypertension    Other specified hypothyroidism    Late onset Alzheimer's disease without behavioral disturbance (HCC)    Chronic pain syndrome    MARCO ANTONIO (generalized anxiety disorder)    Urge incontinence of urine        ASSESSMENT AND PLAN    1. Dementia  2. HTN, cont metoprolol  3. Insomnia, cont Trazodone  4. Hypothyroidism, cont levothyroxine  5. Constipation, cont miralax  6. Chronic Pain, cont Toradol  7. HLD, cont Pravastatin  8. Anxiety, cont Ativan, Prozac  9. Anemia, cont Iron  10.  Dispo, stable, cont to monitor

## 2020-07-10 ENCOUNTER — OUTSIDE SERVICES (OUTPATIENT)
Dept: FAMILY MEDICINE CLINIC | Age: 85
End: 2020-07-10

## 2020-08-10 ENCOUNTER — OUTSIDE SERVICES (OUTPATIENT)
Dept: FAMILY MEDICINE CLINIC | Age: 85
End: 2020-08-10
Payer: MEDICARE

## 2020-08-10 PROCEDURE — 99309 SBSQ NF CARE MODERATE MDM 30: CPT | Performed by: NURSE PRACTITIONER

## 2020-08-10 NOTE — PROGRESS NOTES
[address] [facility]  Progress Note    NAME: Efren Duty: 8/10[birth year]20  ROOM #: A 12-2  : 1928  REASON FOR VISIT: Other (routine visit)    CODE STATUS: DNR/CC    History obtained from chart review, the patient and staff. SUBJECTIVE:  HPI: Richie Shankar is a 80 y.o. female. Patient has a PMH significant for:  Past Medical History:   Diagnosis Date    Anemia     CVA (cerebral infarction)     MARCO ANTONIO (generalized anxiety disorder) 3/13/2019    Hypertension     Hypothyroidism 5/15/2015    Osteoarthritis     Recurrent major depressive disorder ([facility] 2019       Pt seen and examined at bedside today and is noted to be in no acute distress. She is fatigued and her overall condition continues to gradually decline. Staff provide appropriate updates; appreciate staff input. I have reviewed patients past medical, surgical, social, and family history and have made updates where appropriate. Allergies and Medications were reviewed through the [facility] EMR.     Patient Active Problem List    Diagnosis Date Noted    Recurrent major depressive disorder ([facility] 2019    Late onset Alzheimer's disease without behavioral disturbance ([address] 2019    Primary insomnia 2019    Chronic pain syndrome 2019    Dyslipidemia 2019    MARCO ANTONIO (generalized anxiety disorder) 2019    Urge incontinence of urine 2019    Cerebral infarction ([address]      Replacing Inactive Diagnoses      SBO (small bowel obstruction) ([facility] 05/15/2015    Hypothyroidism 05/15/2015    HTN (hypertension) 2012    Osteoarthritis 2012     REVIEW OF SYSTEMS  Positive responses are highlighted in bold  Constitutional:  Fever, Chills, Night Sweats, Fatigue, Unexpected changes in weight  HENT:  Ear pain, Tinnitus, Nosebleeds, Trouble swallowing, Hearing loss, Sore throat, Headaches  Cardiovascular:  Chest Pain, Palpitations, Orthopnea, Paroxysmal Nocturnal Dyspnea  Respiratory:  Cough, Wheezing, Shortness of breath, Chest tightness, Apnea  Gastrointestinal:  Nausea, Vomiting, Diarrhea, Constipation, Heartburn, Blood in stool  Genitourinary:  Difficulty or painful urination, Flank pain, Change in frequency, Urgency  Skin:  Color change, Rash, Itching, Wound  Musculoskeletal:  Joint pain, Back pain, Gait problems, Joint swelling, Myalgias  Neurological:  Dizziness, Headaches, Presyncope, Numbness, Seizures, Tremors  Endocrine:  Heat Intolerance, Cold Intolerance, Polydipsia, Polyphagia, Polyuria    OBJECTIVE:  PHYSICAL EXAM:  VS:  145/86, 96.8, 62, 15, 90% on room air  Weight in pounds:  100.0 (was 103.8, 105.8, 105.2, 104.0)  Pain:  Denies. VS reviewed. General Appearance: Thin and fragile, debilitated, in no acute distress  Head: normocephalic and atraumatic  Eyes: conjunctivae and eye lids without erythema  ENT: external ear and ear canal normal bilaterally, nose without deformity, nasal mucosa and turbinates normal without polyps, oropharynx normal, dentition is normal for age, no lip or gum lesions noted  Neck: supple and non-tender without mass, no thyromegaly or thyroid nodules, no cervical lymphadenopathy  Pulmonary/Chest: clear but diminished to auscultation bilaterally- no wheezes, rales or rhonchi, normal air movement, no respiratory distress or retractions, requires no supplemental oxygen  Cardiovascular: normal rate, regular rhythm, normal S1 and S2, no murmurs, rubs, clicks, or gallops, distal pulses intact  Abdomen: soft, non-tender, non-distended, bowel sounds physiologic,  no rebound or guarding, no masses or hernias noted. Liver and spleen without enlargement.    Extremities: no cyanosis, clubbing or edema of the lower extremities  Musculoskeletal: No joint swelling or gross deformity   Neuro:  Alert, oriented to name only; 3/5 strength globally and symmetrically, soft, mumbled speech, She speaks mainly in Romansh this morning; no focal findings or movement disorder noted  Psych:  Blunt affect without evidence of depression or anxiety, insight and judgement are mostly intact, memory appears mostly intact  Skin: warm and dry, no rash or erythema    ASSESSMENT & PLAN:    1. Acute LLL pneumonia  Resolved. 2. SBO (small bowel obstruction) ([facility]  Patient with a history of SBO, diarrhea and constipation. Daughter cautions use with anti-diarrheal medications. Patient and staff report bowels are moving well, mostly once daily, with no episodes of diarrhea or constipation. Continue current bowel medication regimen. 3. Chronic pain syndrome  Continue home dose of Ketorolac, Tylenol, Biofreeze gel as ordered. 4. MARCO ANTONIO (generalized anxiety disorder)  Seems drowsy this morning. Staff report no increased anxiety. Will decrease dosage of scheduled Ativan at HS to 0.25 mg po daily at HS. Continue to monitor. 5. Essential hypertension  BP at goal.  No chest pain/pressure. Continue Metoprolol. 6. Mild episode of recurrent major depressive disorder ([facility]  Denies SI/HI. Continue Trazodone at HS, Prozac. She is pleasant at time of assessment. Antipsychotic/Antianiety/Hypnotic/Psychyotropic/Sedation/Antidepressant medications are continued at this time because discontinuation may result in adverse effects of return or concerning behaviors/symptoms. 7. Other specified hypothyroidism  Continue current dose of Levothyroxine and lab monitoring. Asymptomatic of hypo/hyperthyroidism. 8. Late onset Alzheimer's disease without behavioral disturbance  Alert and oriented most days. Mild confusion today. Continues home dose of Turmeric. 9. Urge incontinence of urine  Controlled with Oxybutynin. 10. Primary insomnia  Controlled with Ativan and Trazodone. 11. Dyslipidemia  Continue Pravastatin. No ASA. 12. Osteoarthritis, unspecified osteoarthritis type, unspecified site  Continue calcium plus D. 13. Cerebral infarction, unspecified mechanism ([facility]  Remote without deficits.     14.  Acute headaches. No new or reoccurring headaches. Continue prn Tylenol as needed. 15.  Acute Right Lower Lobe pneumonia  Resolved. Disposition:  Assessment and plan as stated above. Follow-up per routine and as warranted. Plan of care reviewed by Dr. Carole Loomis DO.   Electronically signed by SHAE Hoang NP on 8/10/2020 at 1:07 PM

## 2020-08-26 ENCOUNTER — CLINICAL DOCUMENTATION (OUTPATIENT)
Dept: FAMILY MEDICINE CLINIC | Age: 85
End: 2020-08-26

## 2020-08-29 PROBLEM — B35.1 NAIL FUNGUS: Status: ACTIVE | Noted: 2020-08-29

## 2020-09-09 ENCOUNTER — OUTSIDE SERVICES (OUTPATIENT)
Dept: FAMILY MEDICINE CLINIC | Age: 85
End: 2020-09-09
Payer: MEDICARE

## 2020-09-09 PROCEDURE — 99308 SBSQ NF CARE LOW MDM 20: CPT | Performed by: NURSE PRACTITIONER

## 2020-09-09 NOTE — PROGRESS NOTES
[facility] Progress Note    NAME: Alison Gresham: 20  ROOM #: A 12-2  : 1928  REASON FOR VISIT: Other (routine visit)    CODE STATUS: DNR/CC    History obtained from chart review, the patient and staff. SUBJECTIVE:  HPI: Chanell George is a 80 y.o. female. Patient has a PMH significant for:  Past Medical History:   Diagnosis Date    Anemia     CVA (cerebral infarction)     MARCO ANTONIO (generalized anxiety disorder) 3/13/2019    Hypertension     Hypothyroidism 5/15/2015    Osteoarthritis     Recurrent major depressive disorder ([facility] 2019       Pt seen and examined at bedside today and is noted to be in no acute distress. She is fatigued and her overall condition continues to gradually decline. Staff provide appropriate updates; appreciate staff input. I have reviewed patients past medical, surgical, social, and family history and have made updates where appropriate. Allergies and Medications were reviewed through the [facility] EMR.     Patient Active Problem List    Diagnosis Date Noted    Nail fungus 2020    Recurrent major depressive disorder ([facility] 2019    Late onset Alzheimer's disease without behavioral disturbance ([address] 2019    Primary insomnia 2019    Chronic pain syndrome 2019    Dyslipidemia 2019    MARCO ANTONIO (generalized anxiety disorder) 2019    Urge incontinence of urine 2019    Cerebral infarction ([address]      Replacing Inactive Diagnoses      SBO (small bowel obstruction) ([facility] 05/15/2015    Hypothyroidism 05/15/2015    HTN (hypertension) 2012    Osteoarthritis 2012     REVIEW OF SYSTEMS  Positive responses are highlighted in bold  Constitutional:  Fever, Chills, Night Sweats, Fatigue, Unexpected changes in weight  HENT:  Ear pain, Tinnitus, Nosebleeds, Trouble swallowing, Hearing loss, Sore throat, Headaches  Cardiovascular:  Chest Pain, Palpitations, Orthopnea, Paroxysmal Nocturnal Dyspnea  Respiratory: Cough, Wheezing, Shortness of breath, Chest tightness, Apnea  Gastrointestinal:  Nausea, Vomiting, Diarrhea, Constipation, Heartburn, Blood in stool  Genitourinary:  Difficulty or painful urination, Flank pain, Change in frequency, Urgency  Skin:  Color change, Rash, Itching, Wound  Musculoskeletal:  Joint pain, Back pain, Gait problems, Joint swelling, Myalgias  Neurological:  Dizziness, Headaches, Presyncope, Numbness, Seizures, Tremors  Endocrine:  Heat Intolerance, Cold Intolerance, Polydipsia, Polyphagia, Polyuria    OBJECTIVE:  PHYSICAL EXAM:  VS:  116/76, 97.6, 67, 94%  Weight in pounds:  95.0 (was 100.0, 103.8, 105.8, 105.2, 104.0)  Pain:  Denies. VS reviewed. General Appearance: Thin and fragile, debilitated, in no acute distress  Head: normocephalic and atraumatic  Eyes: conjunctivae and eye lids without erythema  ENT: external ear and ear canal normal bilaterally, nose without deformity, nasal mucosa and turbinates normal without polyps, oropharynx normal, dentition is normal for age, no lip or gum lesions noted  Neck: supple and non-tender without mass, no thyromegaly or thyroid nodules, no cervical lymphadenopathy  Pulmonary/Chest: clear but diminished to auscultation bilaterally- no wheezes, rales or rhonchi, normal air movement, no respiratory distress or retractions, requires no supplemental oxygen  Cardiovascular: normal rate, regular rhythm, normal S1 and S2, no murmurs, rubs, clicks, or gallops, distal pulses intact  Abdomen: soft, non-tender, non-distended, bowel sounds physiologic,  no rebound or guarding, no masses or hernias noted. Liver and spleen without enlargement.    Extremities: no cyanosis, clubbing or edema of the lower extremities  Musculoskeletal: No joint swelling or gross deformity   Neuro:  Alert, oriented to name only; 3/5 strength globally and symmetrically, soft, mumbled speech, She speaks mainly in Maldivian this morning; no focal findings or movement disorder noted  Psych: Blunt affect without evidence of depression or anxiety, insight and judgement are mostly intact, memory appears mostly intact  Skin: warm and dry, no rash or erythema    ASSESSMENT & PLAN:    1. Encounter for Hospice  Current with [address] - appreciate their input. She is comfortable without s/s of anxiety at time of assessment. Gradual weight loss persists and her strength and cognition continue to gradually decline. She is well cared for by staff. Continue prn Ativan for anxiety. 2. SBO (small bowel obstruction) ([facility]  Patient with a history of SBO, diarrhea and constipation. Daughter cautions use with anti-diarrheal medications. Patient and staff report bowels are moving well, mostly once daily, with no episodes of diarrhea or constipation. Continue current bowel medication regimen. 3. Chronic pain syndrome  Continue home dose of Ketorolac, Tylenol, Biofreeze gel as ordered. 4. MARCO ANTONIO (generalized anxiety disorder)  Staff report no increased anxiety. Has done well with decrease dosage of scheduled Ativan at HS 0.25 mg. Continue to monitor. 5. Essential hypertension  BP at goal.  No chest pain/pressure. Continue Metoprolol. 6. Mild episode of recurrent major depressive disorder ([facility]  Denies SI/HI. Continue Trazodone at HS, Prozac. She is pleasant at time of assessment. Antipsychotic/Antianiety/Hypnotic/Psychyotropic/Sedation/Antidepressant medications are continued at this time because discontinuation may result in adverse effects of return or concerning behaviors/symptoms. 7. Other specified hypothyroidism  Continue current dose of Levothyroxine and lab monitoring. Asymptomatic of hypo/hyperthyroidism. 8. Late onset Alzheimer's disease without behavioral disturbance  Alert and oriented at times. Mild confusion most days. Continues home dose of Turmeric. 9. Urge incontinence of urine  Controlled with Oxybutynin.     10. Primary insomnia  Controlled with Ativan and

## 2020-09-28 ENCOUNTER — CLINICAL DOCUMENTATION (OUTPATIENT)
Dept: FAMILY MEDICINE CLINIC | Age: 85
End: 2020-09-28

## 2020-09-28 NOTE — PROGRESS NOTES
Patient was started on Macrobid, 100 mg po twice daily on 9/22 for dysuria. Urine was positive for RBC and WBC at that time. Urine is negative for UTI; normal lyssa and does not qualify for culture and sensitivity. Macrobid was discontinued. Probiotic was initiated x 5 days per family's request due to high risk of yeast infections from antibiotics.

## 2020-10-05 ENCOUNTER — OUTSIDE SERVICES (OUTPATIENT)
Dept: FAMILY MEDICINE CLINIC | Age: 85
End: 2020-10-05
Payer: MEDICARE

## 2020-10-05 PROCEDURE — 99308 SBSQ NF CARE LOW MDM 20: CPT | Performed by: NURSE PRACTITIONER

## 2020-10-07 NOTE — PROGRESS NOTES
Nocturnal Dyspnea  Respiratory:  Cough, Wheezing, Shortness of breath, Chest tightness, Apnea  Gastrointestinal:  Nausea, Vomiting, Diarrhea, Constipation, Heartburn, Blood in stool  Genitourinary:  Difficulty or painful urination, Flank pain, Change in frequency, Urgency  Skin:  Color change, Rash, Itching, Wound  Musculoskeletal:  Joint pain, Back pain, Gait problems, Joint swelling, Myalgias  Neurological:  Dizziness, Headaches, Presyncope, Numbness, Seizures, Tremors  Endocrine:  Heat Intolerance, Cold Intolerance, Polydipsia, Polyphagia, Polyuria    OBJECTIVE:  PHYSICAL EXAM:  VS:  117/71, 98.2, 56, 18, 96% on room air  Weight in pounds:  93.6 (was 95.0, 100.0)  Pain:  Denies. VS reviewed. General Appearance: Thin and fragile, debilitated, in no acute distress  Head: normocephalic and atraumatic  Eyes: conjunctivae and eye lids without erythema  Musculoskeletal: No joint swelling or gross deformity   Neuro:  Alert, oriented to name only; 3/5 strength globally and symmetrically, soft, mumbled speech, She speaks mainly in Estonian this morning; no focal findings or movement disorder noted  Psych:  Blunt affect without evidence of depression or anxiety, insight and judgement are mostly intact, memory appears mostly intact  Skin: warm and dry, no rash or erythema    ASSESSMENT & PLAN:    1. Encounter for Hospice  Current with [address] [place] [address] - appreciate their input. She is comfortable without s/s of anxiety at time of assessment. Gradual weight loss persists and her strength and cognition continue to gradually decline. She is well cared for by staff. Continue prn Ativan for anxiety. 2. SBO (small bowel obstruction) ([facility]  Patient with a history of SBO, diarrhea and constipation. Daughter cautions use with anti-diarrheal medications. Patient and staff report bowels are moving well, mostly once daily, with no episodes of diarrhea or constipation. Continue current bowel medication regimen.     3. Chronic

## 2020-11-06 ENCOUNTER — OUTSIDE SERVICES (OUTPATIENT)
Dept: FAMILY MEDICINE CLINIC | Age: 85
End: 2020-11-06
Payer: MEDICARE

## 2020-11-06 VITALS
DIASTOLIC BLOOD PRESSURE: 70 MMHG | BODY MASS INDEX: 15.91 KG/M2 | HEART RATE: 86 BPM | WEIGHT: 87 LBS | SYSTOLIC BLOOD PRESSURE: 123 MMHG | TEMPERATURE: 97.3 F | RESPIRATION RATE: 18 BRPM

## 2020-11-06 PROCEDURE — 99308 SBSQ NF CARE LOW MDM 20: CPT | Performed by: FAMILY MEDICINE

## 2020-11-06 NOTE — PROGRESS NOTES
Jacque Kennedy is a 80 y.o. female that is being seen at [facility] for [address]  2/5/1928      HPI:  Patient seen and examined at bedside. History obtain from patient and chart. Hx is limited due to her dementia. Patient reports that she is doing ok. Denies any acute concerns. No SOB, constipation. No new issues per her chart and nurse. I have reviewed the patient's past medical history, past surgical history, allergies,medications, social and family history and I have made updates where appropriate. Past Medical History:   Diagnosis Date    Anemia     CVA (cerebral infarction)     MARCO ANTONIO (generalized anxiety disorder) 3/13/2019    Hypertension     Hypothyroidism 5/15/2015    Osteoarthritis     Recurrent major depressive disorder ([facility] 5/29/2019       Past Surgical History:   Procedure Laterality Date    APPENDECTOMY  2009    Dr. Erika Alpers  4/1/09    Low anterior resection-Dr Knight    COLON SURGERY  2011    abdominal exploration, EPIFANIO, SBR, repair of ventral hernia-Dr. Portillo [facility]    COLONOSCOPY  99, 04, 09, 10    Dr Morrow Nurse  2006    [facility]  2011    Dr. Erlin Bolaños History     Tobacco Use    Smoking status: Never Smoker    Smokeless tobacco: Never Used   Substance Use Topics    Alcohol use: No    Drug use: No       Family History   Problem Relation Age of Onset    Arthritis Mother     Arthritis Father     Heart Attack Father          Review of Systems        PHYSICAL EXAM:    /70   Pulse 86   Temp 97.3 °F (36.3 °C)   Resp 18   Wt 87 lb (39.5 kg)   BMI 15.91 kg/m²       Physical Exam  Vitals signs and nursing note reviewed. Constitutional:       General: She is not in acute distress. Appearance: She is well-developed. HENT:      Head: Normocephalic and atraumatic.       Right Ear: Hearing and external ear normal.      Left Ear: Hearing and external ear normal.      Nose: Nose normal.   Eyes:      General:         Right eye: No discharge. Left eye: No discharge. Conjunctiva/sclera: Conjunctivae normal.   Neck:      Musculoskeletal: Normal range of motion and neck supple. Thyroid: No thyromegaly. Trachea: No tracheal deviation. Cardiovascular:      Rate and Rhythm: Normal rate and regular rhythm. Heart sounds: Normal heart sounds. No murmur. No friction rub. No gallop. Pulmonary:      Effort: Pulmonary effort is normal. No respiratory distress. Breath sounds: No wheezing or rales. Chest:      Chest wall: No tenderness. Musculoskeletal:         General: No deformity. Lymphadenopathy:      Cervical: No cervical adenopathy. Skin:     General: Skin is warm and dry. Findings: No erythema or rash. Neurological:      Mental Status: She is alert. Motor: No abnormal muscle tone. Coordination: Coordination normal.   Psychiatric:         Cognition and Memory: Memory is impaired. Judgment: Judgment is inappropriate. Howie Burden was seen today for dementia. Diagnoses and all orders for this visit:    Essential hypertension    Other specified hypothyroidism    Late onset Alzheimer's disease without behavioral disturbance (HCC)    Chronic pain syndrome    MARCO ANTONIO (generalized anxiety disorder)    Urge incontinence of urine        ASSESSMENT AND PLAN    1. Dementia  2. HTN, cont metoprolol  3. Insomnia, cont Trazodone  4. Hypothyroidism, cont levothyroxine  5. Constipation, cont miralax  6. Chronic Pain, cont Toradol  7. HLD, cont Pravastatin  8. Anxiety, cont Ativan, Prozac  9. Anemia, cont Iron  10.  Dispo, stable, cont to monitor

## 2020-11-30 ENCOUNTER — OUTSIDE SERVICES (OUTPATIENT)
Dept: FAMILY MEDICINE CLINIC | Age: 85
End: 2020-11-30
Payer: MEDICARE

## 2020-11-30 PROBLEM — R11.2 INTRACTABLE VOMITING WITH NAUSEA: Status: ACTIVE | Noted: 2020-11-30

## 2020-11-30 PROBLEM — Z51.5 ENCOUNTER FOR NURSING HOME HOSPICE CARE: Status: ACTIVE | Noted: 2020-11-30

## 2020-11-30 PROCEDURE — 99308 SBSQ NF CARE LOW MDM 20: CPT | Performed by: NURSE PRACTITIONER

## 2020-11-30 NOTE — PROGRESS NOTES
[facility]  Progress Note    NAME: John Begin: 20  ROOM #: A 06-2  : 1928  REASON FOR VISIT:  Acute visit for nausea with vomiting    CODE STATUS: DNR/CC    History obtained from chart review, the patient and staff. SUBJECTIVE:  HPI: Keke Fulton is a 80 y.o. female. Patient has a PMH significant for:  Past Medical History:   Diagnosis Date    Anemia     CVA (cerebral infarction)     MARCO ANTONIO (generalized anxiety disorder) 3/13/2019    Hypertension     Hypothyroidism 5/15/2015    Osteoarthritis     Recurrent major depressive disorder ([facility] 2019     Pt seen and examined at bedside today for staff's concerns of nausea with vomiting and poor po intake. She is in bed at time of assessment and appears uncomfortable. Assessment and plan are as noted below. Staff provide appropriate updates; appreciate staff input. She is well cared for by staff. I have reviewed patients past medical, surgical, social, and family history and have made updates where appropriate. Allergies and Medications were reviewed through the [facility] EMR.     Patient Active Problem List    Diagnosis Date Noted    Encounter for nursing home hospice care 2020    Intractable vomiting with nausea 2020    Nail fungus 2020    Recurrent major depressive disorder ([facility] 2019    Late onset Alzheimer's disease without behavioral disturbance ([address] 2019    Primary insomnia 2019    Chronic pain syndrome 2019    Dyslipidemia 2019    MARCO ANTONIO (generalized anxiety disorder) 2019    Urge incontinence of urine 2019    Cerebral infarction ([facility]      Replacing Inactive Diagnoses      SBO (small bowel obstruction) ([facility] 05/15/2015    Hypothyroidism 05/15/2015    HTN (hypertension) 2012    Osteoarthritis 2012     REVIEW OF SYSTEMS  Positive responses are highlighted in bold  Constitutional:  Fever, Chills, Night Sweats, Fatigue, Unexpected changes in weight; grimmace  HENT:  Ear pain, Tinnitus, Nosebleeds, Trouble swallowing, Hearing loss, Sore throat, Headaches  Cardiovascular:  Chest Pain, Palpitations, Orthopnea, Paroxysmal Nocturnal Dyspnea  Respiratory:  Cough, Wheezing, Shortness of breath, Chest tightness, Apnea  Gastrointestinal:  Nausea, Vomiting, Diarrhea, Constipation, Heartburn, Blood in stool  Genitourinary:  Difficulty or painful urination, Flank pain, Change in frequency, Urgency  Skin:  Color change, Rash, Itching, Wound  Musculoskeletal:  Joint pain, Back pain, Gait problems, Joint swelling, Myalgias  Neurological:  Dizziness, Headaches, Presyncope, Numbness, Seizures, Tremors  Endocrine:  Heat Intolerance, Cold Intolerance, Polydipsia, Polyphagia, Polyuria    OBJECTIVE:  PHYSICAL EXAM:  VS:  144/88, 98.1, 82, 20, 98% on supplemental oxygen. Weight in pounds:  80.0 (was 93.6, 95.0, 100.0)  Pain:  Denies. VS reviewed. General Appearance: Thin and fragile, debilitated, in no acute distress  Head: normocephalic and atraumatic  Eyes: conjunctivae and eye lids without erythema  ENT: external ear and ear canal normal bilaterally, nose without deformity, nasal mucosa and turbinates normal without polyps, oropharynx normal, dentition is normal for age, no lip or gum lesions noted  Neck: supple and non-tender without mass, no thyromegaly or thyroid nodules, no cervical lymphadenopathy  Pulmonary/Chest: clear but diminished to auscultation bilaterally- no wheezes, rales or rhonchi, normal air movement, no respiratory distress or retractions, requires no supplemental oxygen  Cardiovascular: normal rate, regular rhythm, normal S1 and S2, no murmurs, rubs, clicks, or gallops, distal pulses intact  Abdomen: soft, non-tender, non-distended, bowel sounds physiologic,  no rebound or guarding, no masses or hernias noted. Liver and spleen without enlargement.    Extremities: no cyanosis, clubbing or edema of the lower extremities  Musculoskeletal: No joint swelling or gross deformity; decreased muscle mass   Neuro:  She is asleep at time of assessment; no focal findings or movement disorder noted  Psych:  Blunt affect without evidence of depression or anxiety. She is grimacing in her sleep. Skin: warm and dry, no rash or erythema    ASSESSMENT & PLAN:    1. Encounter for Hospice  Current with [facility] - appreciate their input. She is comfortable without s/s of anxiety at time of assessment. Gradual weight loss persists and her strength and cognition continue to gradually decline. She is well cared for by staff. Continue prn Ativan and Oxycodone - will also schedule Ativan and Oxycodone every 6 hours ATC. 2.  Nausea with emesis  Will schedule Zofran ODT 4 mg po/sl every 6 hours ATC. Continue prn Zofran. Have discontinued scheduled medications since she is not taking PO foods/fluids well. Staff nurse updated that she had an emesis of undigested pills today. Continue comfort measures. Disposition:  Assessment and plan as stated above. Follow-up per routine and as warranted. Plan of care reviewed by Dr. Talitha Baumgarten, DO.   Electronically signed by SHAE Lucia NP on 11/30/2020 at 5:36 PM

## 2020-12-02 ENCOUNTER — OUTSIDE SERVICES (OUTPATIENT)
Dept: FAMILY MEDICINE CLINIC | Age: 85
End: 2020-12-02
Payer: MEDICARE

## 2020-12-02 PROCEDURE — 99307 SBSQ NF CARE SF MDM 10: CPT | Performed by: NURSE PRACTITIONER

## 2020-12-02 NOTE — PROGRESS NOTES
[address] [facility]  Progress Note    NAME: Thania Wiseman: 20  ROOM #: A 06-2  : 1928  REASON FOR VISIT: Other (routine visit)    CODE STATUS: DNR/CC    History obtained from chart review, the patient and staff. SUBJECTIVE:  HPI: Billy Millan is a 80 y.o. female. Patient has a PMH significant for:  Past Medical History:   Diagnosis Date    Anemia     CVA (cerebral infarction)     MARCO ANTONIO (generalized anxiety disorder) 3/13/2019    Hypertension     Hypothyroidism 5/15/2015    Osteoarthritis     Recurrent major depressive disorder ([facility] [address] 2019       Pt seen and examined at bedside today and is noted to be in no acute distress. She is asleep at time of assessment and her son is present at bedside. Her son has no questions or concerns at this time. Staff provide appropriate updates; appreciate staff input. She is well cared for by staff. I have reviewed patients past medical, surgical, social, and family history and have made updates where appropriate. Allergies and Medications were reviewed through the [facility] EMR.     Patient Active Problem List    Diagnosis Date Noted    Encounter for nursing home hospice care 2020    Intractable vomiting with nausea 2020    Nail fungus 2020    Recurrent major depressive disorder ([facility] [address] 2019    Late onset Alzheimer's disease without behavioral disturbance ([facility] [address] 2019    Primary insomnia 2019    Chronic pain syndrome 2019    Dyslipidemia 2019    MARCO ANTONIO (generalized anxiety disorder) 2019    Urge incontinence of urine 2019    Cerebral infarction ([facility] [address]      Replacing Inactive Diagnoses      SBO (small bowel obstruction) ([facility] [address] 05/15/2015    Hypothyroidism 05/15/2015    HTN (hypertension) 2012    Osteoarthritis 2012     REVIEW OF SYSTEMS  Positive responses are highlighted in bold  Constitutional:  Fever, Chills, Night Sweats, Fatigue, Unexpected changes in weight  HENT:  Ear pain, regimen. Disposition:  Assessment and plan as stated above. Follow-up per routine and as warranted. Plan of care reviewed by Dr. Mack Villareal DO.   Electronically signed by SHAE Morrison NP on 12/2/2020 at 3:03 PM